# Patient Record
Sex: MALE | Race: WHITE | NOT HISPANIC OR LATINO | Employment: FULL TIME | URBAN - METROPOLITAN AREA
[De-identification: names, ages, dates, MRNs, and addresses within clinical notes are randomized per-mention and may not be internally consistent; named-entity substitution may affect disease eponyms.]

---

## 2023-12-16 ASSESSMENT — PROMIS GLOBAL HEALTH SCALE
RATE_GENERAL_HEALTH: VERY GOOD
RATE_SOCIAL_SATISFACTION: VERY GOOD
RATE_AVERAGE_PAIN: 0
EMOTIONAL_PROBLEMS: RARELY
RATE_QUALITY_OF_LIFE: VERY GOOD
RATE_MENTAL_HEALTH: VERY GOOD
RATE_PHYSICAL_HEALTH: GOOD
CARRYOUT_PHYSICAL_ACTIVITIES: COMPLETELY
CARRYOUT_SOCIAL_ACTIVITIES: VERY GOOD

## 2023-12-19 ENCOUNTER — OFFICE VISIT (OUTPATIENT)
Dept: PRIMARY CARE | Facility: CLINIC | Age: 43
End: 2023-12-19
Payer: COMMERCIAL

## 2023-12-19 ENCOUNTER — LAB (OUTPATIENT)
Dept: LAB | Facility: LAB | Age: 43
End: 2023-12-19
Payer: COMMERCIAL

## 2023-12-19 VITALS
DIASTOLIC BLOOD PRESSURE: 89 MMHG | SYSTOLIC BLOOD PRESSURE: 130 MMHG | OXYGEN SATURATION: 98 % | WEIGHT: 193.6 LBS | HEIGHT: 74 IN | HEART RATE: 81 BPM | BODY MASS INDEX: 24.85 KG/M2

## 2023-12-19 DIAGNOSIS — Z00.01 ANNUAL VISIT FOR GENERAL ADULT MEDICAL EXAMINATION WITH ABNORMAL FINDINGS: Primary | ICD-10-CM

## 2023-12-19 DIAGNOSIS — I10 HYPERTENSION, UNSPECIFIED TYPE: ICD-10-CM

## 2023-12-19 DIAGNOSIS — Z00.01 ANNUAL VISIT FOR GENERAL ADULT MEDICAL EXAMINATION WITH ABNORMAL FINDINGS: ICD-10-CM

## 2023-12-19 DIAGNOSIS — Z12.11 SCREEN FOR COLON CANCER: ICD-10-CM

## 2023-12-19 LAB
ALBUMIN SERPL BCP-MCNC: 4.4 G/DL (ref 3.4–5)
ALP SERPL-CCNC: 52 U/L (ref 33–120)
ALT SERPL W P-5'-P-CCNC: 29 U/L (ref 10–52)
ANION GAP SERPL CALC-SCNC: 9 MMOL/L (ref 10–20)
AST SERPL W P-5'-P-CCNC: 27 U/L (ref 9–39)
BASOPHILS # BLD AUTO: 0.04 X10*3/UL (ref 0–0.1)
BASOPHILS NFR BLD AUTO: 0.6 %
BILIRUB SERPL-MCNC: 0.7 MG/DL (ref 0–1.2)
BUN SERPL-MCNC: 12 MG/DL (ref 6–23)
CALCIUM SERPL-MCNC: 9.7 MG/DL (ref 8.6–10.6)
CAOX CRY #/AREA UR COMP ASSIST: NORMAL /HPF
CHLORIDE SERPL-SCNC: 107 MMOL/L (ref 98–107)
CHOLEST SERPL-MCNC: 139 MG/DL (ref 0–199)
CHOLESTEROL/HDL RATIO: 2.4
CO2 SERPL-SCNC: 31 MMOL/L (ref 21–32)
CREAT SERPL-MCNC: 0.91 MG/DL (ref 0.5–1.3)
EOSINOPHIL # BLD AUTO: 0.26 X10*3/UL (ref 0–0.7)
EOSINOPHIL NFR BLD AUTO: 4.2 %
ERYTHROCYTE [DISTWIDTH] IN BLOOD BY AUTOMATED COUNT: 13.1 % (ref 11.5–14.5)
EST. AVERAGE GLUCOSE BLD GHB EST-MCNC: 82 MG/DL
GFR SERPL CREATININE-BSD FRML MDRD: >90 ML/MIN/1.73M*2
GLUCOSE SERPL-MCNC: 93 MG/DL (ref 74–99)
HBA1C MFR BLD: 4.5 %
HCT VFR BLD AUTO: 46.1 % (ref 41–52)
HDLC SERPL-MCNC: 58.8 MG/DL
HGB BLD-MCNC: 15.5 G/DL (ref 13.5–17.5)
IMM GRANULOCYTES # BLD AUTO: 0.02 X10*3/UL (ref 0–0.7)
IMM GRANULOCYTES NFR BLD AUTO: 0.3 % (ref 0–0.9)
LDLC SERPL CALC-MCNC: 71 MG/DL
LYMPHOCYTES # BLD AUTO: 1.04 X10*3/UL (ref 1.2–4.8)
LYMPHOCYTES NFR BLD AUTO: 16.6 %
MCH RBC QN AUTO: 30.2 PG (ref 26–34)
MCHC RBC AUTO-ENTMCNC: 33.6 G/DL (ref 32–36)
MCV RBC AUTO: 90 FL (ref 80–100)
MONOCYTES # BLD AUTO: 0.46 X10*3/UL (ref 0.1–1)
MONOCYTES NFR BLD AUTO: 7.4 %
MUCOUS THREADS #/AREA URNS AUTO: NORMAL /LPF
NEUTROPHILS # BLD AUTO: 4.43 X10*3/UL (ref 1.2–7.7)
NEUTROPHILS NFR BLD AUTO: 70.9 %
NON HDL CHOLESTEROL: 80 MG/DL (ref 0–149)
NRBC BLD-RTO: 0 /100 WBCS (ref 0–0)
PLATELET # BLD AUTO: 250 X10*3/UL (ref 150–450)
POTASSIUM SERPL-SCNC: 4.4 MMOL/L (ref 3.5–5.3)
PROT SERPL-MCNC: 7.2 G/DL (ref 6.4–8.2)
PSA SERPL-MCNC: 0.35 NG/ML
RBC # BLD AUTO: 5.14 X10*6/UL (ref 4.5–5.9)
RBC #/AREA URNS AUTO: NORMAL /HPF
SODIUM SERPL-SCNC: 143 MMOL/L (ref 136–145)
TRIGL SERPL-MCNC: 45 MG/DL (ref 0–149)
TSH SERPL-ACNC: 0.64 MIU/L (ref 0.44–3.98)
VLDL: 9 MG/DL (ref 0–40)
WBC # BLD AUTO: 6.3 X10*3/UL (ref 4.4–11.3)
WBC #/AREA URNS AUTO: NORMAL /HPF

## 2023-12-19 PROCEDURE — 99396 PREV VISIT EST AGE 40-64: CPT | Performed by: INTERNAL MEDICINE

## 2023-12-19 PROCEDURE — 81001 URINALYSIS AUTO W/SCOPE: CPT

## 2023-12-19 PROCEDURE — 80053 COMPREHEN METABOLIC PANEL: CPT

## 2023-12-19 PROCEDURE — 3079F DIAST BP 80-89 MM HG: CPT | Performed by: INTERNAL MEDICINE

## 2023-12-19 PROCEDURE — 84153 ASSAY OF PSA TOTAL: CPT

## 2023-12-19 PROCEDURE — 36415 COLL VENOUS BLD VENIPUNCTURE: CPT

## 2023-12-19 PROCEDURE — 1036F TOBACCO NON-USER: CPT | Performed by: INTERNAL MEDICINE

## 2023-12-19 PROCEDURE — 85025 COMPLETE CBC W/AUTO DIFF WBC: CPT

## 2023-12-19 PROCEDURE — 84443 ASSAY THYROID STIM HORMONE: CPT

## 2023-12-19 PROCEDURE — 83036 HEMOGLOBIN GLYCOSYLATED A1C: CPT

## 2023-12-19 PROCEDURE — 3075F SYST BP GE 130 - 139MM HG: CPT | Performed by: INTERNAL MEDICINE

## 2023-12-19 PROCEDURE — 80061 LIPID PANEL: CPT

## 2023-12-19 ASSESSMENT — PATIENT HEALTH QUESTIONNAIRE - PHQ9
2. FEELING DOWN, DEPRESSED OR HOPELESS: NOT AT ALL
1. LITTLE INTEREST OR PLEASURE IN DOING THINGS: NOT AT ALL
SUM OF ALL RESPONSES TO PHQ9 QUESTIONS 1 AND 2: 0

## 2023-12-19 NOTE — PROGRESS NOTES
Subjective   Patient ID: Home Davies is a 43 y.o. male who presents for Annual Exam.    Assessment/Plan   Borderline hypertension advised to monitor blood pressure at home cut down salt stress and caffeine to exercise  Check CMP lipid thyroid  Coronary calcium score  Advised hepatitis B MMR varicella Tdap COVID-19 vaccine  Family history of colon cancer refer to GI for colonoscopy  Follow-up annually  Problem List Items Addressed This Visit       Hypertension    Relevant Orders    CBC and Auto Differential    Comprehensive Metabolic Panel    Hemoglobin A1C    Lipid Panel    TSH with reflex to Free T4 if abnormal    Prostate Specific Antigen, Screen    Microscopic Only, Urine    CT cardiac scoring wo IV contrast    Colonoscopy Screening; Average Risk Patient    Screen for colon cancer - Primary    Relevant Orders    Colonoscopy Screening; Average Risk Patient       HPI 49-year-old patient  with 3 children    3 brother 2 sister positive for hypertension    Father have COVID hypertension    Mother have heart attack hypertension    Grandfather of colon cancer    Personal history of diet-controlled hypertension    Negative for headache chest pain    Negative for depression or fall    Negative for COVID-19  Past Medical History:   Diagnosis Date    Body mass index (BMI) 29.0-29.9, adult 10/23/2019    BMI 29.0-29.9,adult    Cellulitis of left lower limb 10/27/2020    Cellulitis of left leg without foot    Corns and callosities 08/27/2019    Callus of foot    Encounter for immunization 12/14/2021    Influenza vaccine administered    Generalized anxiety disorder 08/27/2019    DANIEL (generalized anxiety disorder)    Hypertension     Obesity, unspecified 09/24/2019    Obesity (BMI 30.0-34.9)    Other injury of unspecified body region, initial encounter 10/27/2020    Hematoma    Pain in right shoulder 05/21/2020    Right shoulder pain    Personal history of other diseases of the digestive system 08/30/2016    History  of irritable bowel syndrome    Personal history of other diseases of urinary system 09/19/2016    History of hematuria    Personal history of other endocrine, nutritional and metabolic disease 05/21/2020    History of vitamin D deficiency    Personal history of urinary calculi 10/23/2019    History of renal calculi    Unspecified injury of left lower leg, initial encounter 10/27/2020    Injury of left lower extremity, initial encounter     Past Surgical History:   Procedure Laterality Date    BACK SURGERY  08/30/2016    Back Surgery     No Known Allergies  No current outpatient medications on file.     No current facility-administered medications for this visit.     Family History   Problem Relation Name Age of Onset    Hypertension Mother Dedra     Asthma Father Caio     COPD Father Caio     Diabetes Father Caio     Hypertension Father Caio     Stroke Father Caio     Diabetes Sister Ivory     Hearing loss Sister Ivory     Hypertension Brother Davon     Heart disease Maternal Grandmother Ivory     Hypertension Maternal Grandmother Ivory     Colon cancer Maternal Grandfather Aron     Heart disease Paternal Grandmother Radha     Hypertension Paternal Grandmother Radha     Stroke Paternal Grandmother Radha     Cancer Paternal Grandfather Caio      Social History     Socioeconomic History    Marital status:      Spouse name: None    Number of children: None    Years of education: None    Highest education level: None   Occupational History    None   Tobacco Use    Smoking status: Never    Smokeless tobacco: Never   Substance and Sexual Activity    Alcohol use: Never    Drug use: Never    Sexual activity: Yes     Partners: Female     Birth control/protection: None   Other Topics Concern    None   Social History Narrative    None     Social Determinants of Health     Financial Resource Strain: Not on file   Food Insecurity: Not on file   Transportation Needs: Not on file   Physical Activity: Not on file   Stress: Not  "on file   Social Connections: Not on file   Intimate Partner Violence: Not on file   Housing Stability: Not on file     Immunization History   Administered Date(s) Administered    Influenza, injectable, MDCK, preservative free, quadrivalent 11/10/2022, 10/06/2023    Influenza, seasonal, injectable 09/14/2021    Pfizer COVID-19 vaccine, Fall 2023, 12 years and older, (30mcg/0.3mL) 10/06/2023    Pfizer COVID-19 vaccine, bivalent, age 12 years and older (30 mcg/0.3 mL) 11/10/2022    Pfizer Purple Cap SARS-CoV-2 04/03/2021, 04/24/2021, 11/01/2021       Review of Systems  Review of systems is otherwise negative unless stated above or in history of present illness.    Objective   Visit Vitals  /89 (BP Location: Left arm, Patient Position: Sitting, BP Cuff Size: Large adult)   Pulse 81   Ht 1.88 m (6' 2\")   Wt 87.8 kg (193 lb 9.6 oz)   SpO2 98%   BMI 24.86 kg/m²   Smoking Status Never   BSA 2.14 m²     Physical Exam  Constitutional:       General: not in acute distress.   HENT:      Head: Normocephalic and atraumatic.      Nose: Nose normal.   Eyes:      Extraocular Movements: Extraocular movements intact.      Conjunctiva/sclera: Conjunctivae normal.   Cardiovascular:      Rate and Rhythm: Normal rate ,  No M/R/G  Pulmonary:      Effort: Pulmonary effort is normal.      Breath sounds: Normal, Bilat Equal AE  Skin:     General: Skin is warm.   Neurological:      Mental Status: He is alert and oriented to person, place, and time.   Psychiatric:         Mood and Affect: Mood normal.         Behavior: Behavior normal.   Musculoskeletal   FROM in all extremitirs,  Joint-no swelling or tenderness    No visits with results within 4 Month(s) from this visit.   Latest known visit with results is:   Legacy Encounter on 12/02/2022   Component Date Value Ref Range Status    Glucose 12/02/2022 88  74 - 99 mg/dL Final    Sodium 12/02/2022 142  136 - 145 mmol/L Final    Potassium 12/02/2022 4.1  3.5 - 5.3 mmol/L Final    Chloride " 12/02/2022 106  98 - 107 mmol/L Final    Bicarbonate 12/02/2022 29  21 - 32 mmol/L Final    Anion Gap 12/02/2022 11  10 - 20 mmol/L Final    Urea Nitrogen 12/02/2022 16  6 - 23 mg/dL Final    Creatinine 12/02/2022 0.80  0.50 - 1.30 mg/dL Final    GFR MALE 12/02/2022 >90  >90 mL/min/1.73m2 Final    Calcium 12/02/2022 9.6  8.6 - 10.6 mg/dL Final    Phosphorus 12/02/2022 3.8  2.5 - 4.9 mg/dL Final    Albumin 12/02/2022 4.5  3.4 - 5.0 g/dL Final       Radiology: Reviewed imaging in powerchart.  No results found.      Charting was completed using voice recognition technology and may include unintended errors.

## 2023-12-21 ENCOUNTER — TELEPHONE (OUTPATIENT)
Dept: PRIMARY CARE | Facility: CLINIC | Age: 43
End: 2023-12-21
Payer: COMMERCIAL

## 2023-12-21 NOTE — TELEPHONE ENCOUNTER
----- Message from Harman Boland MD sent at 12/21/2023 10:34 AM EST -----  Low lymphocyte low anion gap advised multivitamin B12 folic acid OTC 1 a day

## 2024-06-19 ENCOUNTER — TELEPHONE (OUTPATIENT)
Dept: PRIMARY CARE | Facility: CLINIC | Age: 44
End: 2024-06-19
Payer: COMMERCIAL

## 2024-06-19 ENCOUNTER — DOCUMENTATION (OUTPATIENT)
Dept: PRIMARY CARE | Facility: CLINIC | Age: 44
End: 2024-06-19
Payer: COMMERCIAL

## 2024-06-19 NOTE — TELEPHONE ENCOUNTER
6/19: left voicemail for patient to call back in regards to rescheduling appointment 12/20, thank you. -SM

## 2024-12-20 ENCOUNTER — APPOINTMENT (OUTPATIENT)
Dept: PRIMARY CARE | Facility: CLINIC | Age: 44
End: 2024-12-20
Payer: COMMERCIAL

## 2025-01-10 ASSESSMENT — PROMIS GLOBAL HEALTH SCALE
RATE_GENERAL_HEALTH: VERY GOOD
CARRYOUT_SOCIAL_ACTIVITIES: VERY GOOD
RATE_PHYSICAL_HEALTH: VERY GOOD
RATE_MENTAL_HEALTH: VERY GOOD
CARRYOUT_PHYSICAL_ACTIVITIES: COMPLETELY
EMOTIONAL_PROBLEMS: RARELY
RATE_QUALITY_OF_LIFE: VERY GOOD
RATE_AVERAGE_PAIN: 0
RATE_SOCIAL_SATISFACTION: VERY GOOD

## 2025-01-14 ENCOUNTER — APPOINTMENT (OUTPATIENT)
Dept: PRIMARY CARE | Facility: CLINIC | Age: 45
End: 2025-01-14
Payer: COMMERCIAL

## 2025-01-16 ENCOUNTER — APPOINTMENT (OUTPATIENT)
Dept: PRIMARY CARE | Facility: CLINIC | Age: 45
End: 2025-01-16
Payer: COMMERCIAL

## 2025-01-16 VITALS
HEIGHT: 74 IN | DIASTOLIC BLOOD PRESSURE: 85 MMHG | TEMPERATURE: 97.6 F | HEART RATE: 56 BPM | BODY MASS INDEX: 23.87 KG/M2 | OXYGEN SATURATION: 98 % | WEIGHT: 186 LBS | SYSTOLIC BLOOD PRESSURE: 142 MMHG

## 2025-01-16 DIAGNOSIS — I10 HYPERTENSION, UNSPECIFIED TYPE: ICD-10-CM

## 2025-01-16 DIAGNOSIS — Z00.01 ANNUAL VISIT FOR GENERAL ADULT MEDICAL EXAMINATION WITH ABNORMAL FINDINGS: Primary | ICD-10-CM

## 2025-01-16 LAB
NON-UH HIE A/G RATIO: 1.4
NON-UH HIE ALB: 4 G/DL (ref 3.4–5)
NON-UH HIE ALK PHOS: 59 UNIT/L (ref 45–117)
NON-UH HIE BASO COUNT: 0.07 X1000 (ref 0–0.2)
NON-UH HIE BASOS %: 1.1 %
NON-UH HIE BILIRUBIN, TOTAL: 0.6 MG/DL (ref 0.3–1.2)
NON-UH HIE BUN/CREAT RATIO: 15.6
NON-UH HIE BUN: 14 MG/DL (ref 9–23)
NON-UH HIE CALCIUM: 9.6 MG/DL (ref 8.7–10.4)
NON-UH HIE CALCULATED LDL CHOLESTEROL: 70 MG/DL (ref 60–130)
NON-UH HIE CALCULATED OSMOLALITY: 286 MOSM/KG (ref 275–295)
NON-UH HIE CHLORIDE: 112 MMOL/L (ref 98–107)
NON-UH HIE CHOLESTEROL: 133 MG/DL (ref 100–200)
NON-UH HIE CO2, VENOUS: 26 MMOL/L (ref 20–31)
NON-UH HIE CREATININE: 0.9 MG/DL (ref 0.6–1.1)
NON-UH HIE DIFF?: NO
NON-UH HIE EOS COUNT: 0.21 X1000 (ref 0–0.5)
NON-UH HIE EOSIN %: 3.6 %
NON-UH HIE GFR AA: >60
NON-UH HIE GLOBULIN: 2.9 G/DL
NON-UH HIE GLOMERULAR FILTRATION RATE: >60 ML/MIN/1.73M?
NON-UH HIE GLUCOSE: 100 MG/DL (ref 74–106)
NON-UH HIE GOT: 28 UNIT/L (ref 15–37)
NON-UH HIE GPT: 28 UNIT/L (ref 10–49)
NON-UH HIE HCT: 44.1 % (ref 41–52)
NON-UH HIE HDL CHOLESTEROL: 56 MG/DL (ref 40–60)
NON-UH HIE HGB: 15.1 G/DL (ref 13.5–17.5)
NON-UH HIE INSTR WBC: 5.8
NON-UH HIE K: 4.5 MMOL/L (ref 3.5–5.1)
NON-UH HIE LYMPH %: 16.1 %
NON-UH HIE LYMPH COUNT: 0.93 X1000 (ref 1.2–4.8)
NON-UH HIE MAGNESIUM: 2 MG/DL (ref 1.6–2.6)
NON-UH HIE MCH: 29.6 PG (ref 27–34)
NON-UH HIE MCHC: 34.2 G/DL (ref 32–37)
NON-UH HIE MCV: 86.6 FL (ref 80–100)
NON-UH HIE MONO %: 9.2 %
NON-UH HIE MONO COUNT: 0.53 X1000 (ref 0.1–1)
NON-UH HIE MPV: 8.7 FL (ref 7.4–10.4)
NON-UH HIE NA: 143 MMOL/L (ref 135–145)
NON-UH HIE NEUTROPHIL %: 70.1 %
NON-UH HIE NEUTROPHIL COUNT (ANC): 4.04 X1000 (ref 1.4–8.8)
NON-UH HIE NUCLEATED RBC: 0 /100WBC
NON-UH HIE PLATELET: 277 X10 (ref 150–450)
NON-UH HIE PROSTATIC SPECIFIC AG SCREEN: 0.4 NG/ML (ref 0–4)
NON-UH HIE RBC: 5.1 X10 (ref 4.7–6.1)
NON-UH HIE RDW: 13.4 % (ref 11.5–14.5)
NON-UH HIE TOTAL CHOL/HDL CHOL RATIO: 2.4
NON-UH HIE TOTAL PROTEIN: 6.9 G/DL (ref 5.7–8.2)
NON-UH HIE TRIGLYCERIDES: 36 MG/DL (ref 30–150)
NON-UH HIE TSH: 0.62 UIU/ML (ref 0.55–4.78)
NON-UH HIE WBC: 5.8 X10 (ref 4.5–11)

## 2025-01-16 PROCEDURE — 99213 OFFICE O/P EST LOW 20 MIN: CPT | Performed by: INTERNAL MEDICINE

## 2025-01-16 PROCEDURE — 3079F DIAST BP 80-89 MM HG: CPT | Performed by: INTERNAL MEDICINE

## 2025-01-16 PROCEDURE — 3077F SYST BP >= 140 MM HG: CPT | Performed by: INTERNAL MEDICINE

## 2025-01-16 PROCEDURE — 99396 PREV VISIT EST AGE 40-64: CPT | Performed by: INTERNAL MEDICINE

## 2025-01-16 PROCEDURE — 3008F BODY MASS INDEX DOCD: CPT | Performed by: INTERNAL MEDICINE

## 2025-01-16 PROCEDURE — 1036F TOBACCO NON-USER: CPT | Performed by: INTERNAL MEDICINE

## 2025-01-16 ASSESSMENT — PATIENT HEALTH QUESTIONNAIRE - PHQ9
SUM OF ALL RESPONSES TO PHQ9 QUESTIONS 1 AND 2: 0
2. FEELING DOWN, DEPRESSED OR HOPELESS: NOT AT ALL
1. LITTLE INTEREST OR PLEASURE IN DOING THINGS: NOT AT ALL

## 2025-01-16 NOTE — PROGRESS NOTES
"Subjective   Patient ID: Home Davies \"Jose\" is a 44 y.o. male who presents for Annual Exam.    Assessment/Plan     Problem List Items Addressed This Visit       Hypertension     Patients BP readings reviewed and addressed, as we age our arteries turn stiffer and less elastic. Restricting salt consumption and staying physically fit with regular exercise regimen is the only way to keep our vasculature less tonic. Studies have shown that keeping ideal body wt, exercise routine about 140 to 150 minutes a week, eating variety of plant based diet and drinking plentiful water are quite helpful. Monitor BP twice or once a week at home and bring log to be reviewed by me. Uncontrolled BP has long term consequences including heart failure, myocardial infarction, accelerated atherosclerosis and kidney dysfunction. Therapy reviewed and explained.           Relevant Orders    CBC and Auto Differential    Comprehensive Metabolic Panel    Lipid Panel    Magnesium    Prostate Specific Antigen, Screen    TSH with reflex to Free T4 if abnormal    CT cardiac scoring wo IV contrast    Annual visit for general adult medical examination with abnormal findings - Primary    Relevant Orders    CBC and Auto Differential    Comprehensive Metabolic Panel    Lipid Panel    Magnesium    Prostate Specific Antigen, Screen    TSH with reflex to Free T4 if abnormal       HPI this is a 44-year-old patient who is  have a 3 children    Negative for tobacco alcohol drug    Working the bank    Wife is a teacher    Mother have hypertension    Father  from the COVID-19.    Brother's history of high blood pressure    Recently started running around 3 miles had a bradycardia colonoscopy done polypectomy grandfather of colon cancer    Negative for headache chest pain hematuria rectal bleeding or fall    Negative for any RSV flu or COVID-19.  Patient always have a high blood pressure when he comes to my office but he checked at home and also had " a different doctor's office blood pressure was normal with the strong family history of high blood pressure in the family advised to check the calcium score for hide check thyroid parathyroid kidney and liver function test and follow-up advised continue diet exercise and follow-up 6-month  Past Medical History:   Diagnosis Date    Body mass index (BMI) 29.0-29.9, adult 10/23/2019    BMI 29.0-29.9,adult    Cellulitis of left lower limb 10/27/2020    Cellulitis of left leg without foot    Corns and callosities 08/27/2019    Callus of foot    Encounter for immunization 12/14/2021    Influenza vaccine administered    Generalized anxiety disorder 08/27/2019    DANIEL (generalized anxiety disorder)    Hypertension     Obesity, unspecified 09/24/2019    Obesity (BMI 30.0-34.9)    Other injury of unspecified body region, initial encounter 10/27/2020    Hematoma    Pain in right shoulder 05/21/2020    Right shoulder pain    Personal history of other diseases of the digestive system 08/30/2016    History of irritable bowel syndrome    Personal history of other diseases of urinary system 09/19/2016    History of hematuria    Personal history of other endocrine, nutritional and metabolic disease 05/21/2020    History of vitamin D deficiency    Personal history of urinary calculi 10/23/2019    History of renal calculi    Unspecified injury of left lower leg, initial encounter 10/27/2020    Injury of left lower extremity, initial encounter     Past Surgical History:   Procedure Laterality Date    BACK SURGERY  08/30/2016    Back Surgery     No Known Allergies  No current outpatient medications on file.     No current facility-administered medications for this visit.     Family History   Problem Relation Name Age of Onset    Hypertension Mother Dedra     Asthma Father Caio     COPD Father Caio     Diabetes Father Caio     Hypertension Father Caio     Stroke Father Caio     Diabetes Sister Ivory     Hearing loss Sister Ivory      "Hypertension Brother Davon     Heart disease Maternal Grandmother Ivory     Hypertension Maternal Grandmother Ivory     Colon cancer Maternal Grandfather Aron     Heart disease Paternal Grandmother Radha     Hypertension Paternal Grandmother Radha     Stroke Paternal Grandmother Radha     Cancer Paternal Grandfather Caio      Social History     Socioeconomic History    Marital status:    Tobacco Use    Smoking status: Never    Smokeless tobacco: Never   Substance and Sexual Activity    Alcohol use: Never    Drug use: Never    Sexual activity: Yes     Partners: Female     Birth control/protection: None     Immunization History   Administered Date(s) Administered    COVID-19, mRNA, LNP-S, PF, 30 mcg/0.3 mL dose 04/03/2021, 04/24/2021, 11/01/2021    Flu vaccine, quadrivalent, no egg protein, age 6 month or greater (FLUCELVAX) 11/10/2022, 10/06/2023    Flu vaccine, trivalent, preservative free, age 6 months and greater (Fluarix/Fluzone/Flulaval) 11/10/2024    Influenza, seasonal, injectable 09/14/2021    Pfizer COVID-19 vaccine, 12 years and older, (30mcg/0.3mL) (Comirnaty) 10/06/2023, 11/10/2024    Pfizer COVID-19 vaccine, bivalent, age 12 years and older (30 mcg/0.3 mL) 11/10/2022    Td vaccine, age 7 years and older (TDVAX) 05/26/2024       Review of Systems  Review of systems is otherwise negative unless stated above or in history of present illness.    Objective   Visit Vitals  /85   Pulse 56   Temp 36.4 °C (97.6 °F)   Ht 1.88 m (6' 2\")   Wt 84.4 kg (186 lb)   SpO2 98%   BMI 23.88 kg/m²   Smoking Status Never   BSA 2.1 m²     Physical Exam  Constitutional:       General: not in acute distress.   HENT:      Head: Normocephalic and atraumatic.      Nose: Nose normal.   Eyes:      Extraocular Movements: Extraocular movements intact.      Conjunctiva/sclera: Conjunctivae normal.   Cardiovascular: Sinus bradycardia     Rate and Rhythm: Normal rate ,  No M/R/G  Pulmonary:      Effort: Pulmonary effort is normal. "      Breath sounds: Normal, Bilat Equal AE  Skin:     General: Skin is warm.   Neurological:      Mental Status: He is alert and oriented to person, place, and time.   Psychiatric:         Mood and Affect: Mood normal.         Behavior: Behavior normal.   Musculoskeletal   FROM in all extremitirs,  Joint-no swelling or tenderness    No visits with results within 4 Month(s) from this visit.   Latest known visit with results is:   Lab on 12/19/2023   Component Date Value Ref Range Status    WBC 12/19/2023 6.3  4.4 - 11.3 x10*3/uL Final    nRBC 12/19/2023 0.0  0.0 - 0.0 /100 WBCs Final    RBC 12/19/2023 5.14  4.50 - 5.90 x10*6/uL Final    Hemoglobin 12/19/2023 15.5  13.5 - 17.5 g/dL Final    Hematocrit 12/19/2023 46.1  41.0 - 52.0 % Final    MCV 12/19/2023 90  80 - 100 fL Final    MCH 12/19/2023 30.2  26.0 - 34.0 pg Final    MCHC 12/19/2023 33.6  32.0 - 36.0 g/dL Final    RDW 12/19/2023 13.1  11.5 - 14.5 % Final    Platelets 12/19/2023 250  150 - 450 x10*3/uL Final    Neutrophils % 12/19/2023 70.9  40.0 - 80.0 % Final    Immature Granulocytes %, Automated 12/19/2023 0.3  0.0 - 0.9 % Final    Lymphocytes % 12/19/2023 16.6  13.0 - 44.0 % Final    Monocytes % 12/19/2023 7.4  2.0 - 10.0 % Final    Eosinophils % 12/19/2023 4.2  0.0 - 6.0 % Final    Basophils % 12/19/2023 0.6  0.0 - 2.0 % Final    Neutrophils Absolute 12/19/2023 4.43  1.20 - 7.70 x10*3/uL Final    Immature Granulocytes Absolute, Au* 12/19/2023 0.02  0.00 - 0.70 x10*3/uL Final    Lymphocytes Absolute 12/19/2023 1.04 (L)  1.20 - 4.80 x10*3/uL Final    Monocytes Absolute 12/19/2023 0.46  0.10 - 1.00 x10*3/uL Final    Eosinophils Absolute 12/19/2023 0.26  0.00 - 0.70 x10*3/uL Final    Basophils Absolute 12/19/2023 0.04  0.00 - 0.10 x10*3/uL Final    Glucose 12/19/2023 93  74 - 99 mg/dL Final    Sodium 12/19/2023 143  136 - 145 mmol/L Final    Potassium 12/19/2023 4.4  3.5 - 5.3 mmol/L Final    Chloride 12/19/2023 107  98 - 107 mmol/L Final    Bicarbonate  12/19/2023 31  21 - 32 mmol/L Final    Anion Gap 12/19/2023 9 (L)  10 - 20 mmol/L Final    Urea Nitrogen 12/19/2023 12  6 - 23 mg/dL Final    Creatinine 12/19/2023 0.91  0.50 - 1.30 mg/dL Final    eGFR 12/19/2023 >90  >60 mL/min/1.73m*2 Final    Calcium 12/19/2023 9.7  8.6 - 10.6 mg/dL Final    Albumin 12/19/2023 4.4  3.4 - 5.0 g/dL Final    Alkaline Phosphatase 12/19/2023 52  33 - 120 U/L Final    Total Protein 12/19/2023 7.2  6.4 - 8.2 g/dL Final    AST 12/19/2023 27  9 - 39 U/L Final    Bilirubin, Total 12/19/2023 0.7  0.0 - 1.2 mg/dL Final    ALT 12/19/2023 29  10 - 52 U/L Final    Hemoglobin A1C 12/19/2023 4.5  see below % Final    Estimated Average Glucose 12/19/2023 82  Not Established mg/dL Final    Cholesterol 12/19/2023 139  0 - 199 mg/dL Final    HDL-Cholesterol 12/19/2023 58.8  mg/dL Final    Cholesterol/HDL Ratio 12/19/2023 2.4   Final    LDL Calculated 12/19/2023 71  <=99 mg/dL Final    VLDL 12/19/2023 9  0 - 40 mg/dL Final    Triglycerides 12/19/2023 45  0 - 149 mg/dL Final    Non HDL Cholesterol 12/19/2023 80  0 - 149 mg/dL Final    Thyroid Stimulating Hormone 12/19/2023 0.64  0.44 - 3.98 mIU/L Final    Prostate Specific Antigen,Screen 12/19/2023 0.35  <=4.00 ng/mL Final    WBC, Urine 12/19/2023 1-5  1-5, NONE /HPF Final    RBC, Urine 12/19/2023 3-5  NONE, 1-2, 3-5 /HPF Final    Mucus, Urine 12/19/2023 2+  Reference range not established. /LPF Final    Calcium Oxalate Crystals, Urine 12/19/2023 1+  NONE, 1+ /HPF Final       Radiology: Reviewed imaging in powerchart.  No results found.      Charting was completed using voice recognition technology and may include unintended errors.

## 2026-01-13 ENCOUNTER — APPOINTMENT (OUTPATIENT)
Dept: PRIMARY CARE | Facility: CLINIC | Age: 46
End: 2026-01-13
Payer: COMMERCIAL